# Patient Record
Sex: MALE | Race: WHITE | HISPANIC OR LATINO | Employment: FULL TIME | URBAN - NONMETROPOLITAN AREA
[De-identification: names, ages, dates, MRNs, and addresses within clinical notes are randomized per-mention and may not be internally consistent; named-entity substitution may affect disease eponyms.]

---

## 2023-11-20 ENCOUNTER — TELEPHONE (OUTPATIENT)
Dept: PULMONOLOGY | Facility: CLINIC | Age: 43
End: 2023-11-20

## 2023-11-20 NOTE — TELEPHONE ENCOUNTER
Caller: JULIO JEREZ    Relationship: SELF    Best call back number: [phone]    What form or medical record are you requesting: APPT REMINDER FOR Blessing MORRISON, 12/7/23, 2:00 pm ([employer] EMPLOYEES ARE UNABLE TO MAIL PRINT REMINDERS.)    Who is requesting this form or medical record from you: PATIENT REQUESTED    How would you like to receive the form or medical records (pick-up, mail, fax): MAIL  If mail, what is the address:   [address] [place] [address]

## 2023-12-06 NOTE — PROGRESS NOTES
"PRINCE Tang  [facility]   Pulmonary and Critical Care  [address] [place] [address]  Phone: [phone]  Fax: [phone]           Chief Complaint  Abnormal PFT    Subjective    History of Present Illness     Montse Michaels presents to [facility] PULMONARY & CRITICAL CARE MEDICINE   History of Present Illness  Mr. Michaels is a 43-year-old male patient referred by Mahsa Warner NP for abnormal PFT.  Past medical history for low testosterone on supplement.  His mother had COPD. His smoking history includes former smoker of 1 PPD x 25 years quitting in 2020.  He is on no inhalers. 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 Pulmonary function study last month showed an FEV1 of 62% predicted showing moderate restriction.  He has dyspnea that is periodic with climbing steps. He walks on a treadmill and lifts weights.  He has a cough that is worse with weather change until he acclimates. He has a lot of nasal drainage. He has tried various nasal sprays or antihistamines in the past which have helped but he does not like to take them. He denies fever, chills, night sweats. He denies swelling. He had an inconclusive home study about 3-4 years ago. He felt at that time he was not sleeping well, lost of headaches, fatigue and only later was felt to be his testosterone. He is trying to loose weight. He states he would pull the mask off in his sleep over a week and a half for the test.        Objective   Vital Signs:   /98   Pulse 81   Ht 177.8 cm (70\")   Wt 118 kg (260 lb 12.8 oz)   SpO2 97% Comment: RA  BMI 37.42 kg/m²     Physical Exam  Vitals reviewed.   Constitutional:       Appearance: Normal appearance. He is obese.   Cardiovascular:      Rate and Rhythm: Normal rate and regular rhythm.   Pulmonary:      Effort: Pulmonary effort is normal.      Breath sounds: Normal " breath sounds.   Neurological:      General: No focal deficit present.      Mental Status: He is alert and oriented to person, place, and time.   Psychiatric:         Mood and Affect: Mood normal.         Behavior: Behavior normal.          Result Review :  The following data was reviewed by: PRINCE Tang on 12/07/2023:    My interpretation of imaging: None  My interpretation of labs: None      My interpretation of the PFT : Moderate restriction with an FEV1 FVC ratio of 78 and FEV1 is 62% predicted.    No results found for this or any previous visit.        Assessment and Plan   Diagnoses and all orders for this visit:    1. Chemical exposure (Primary)  -     CT Chest Hi Resolution Diagnostic; Future    2. Dyspnea on exertion  -     Complete PFT - Pre & Post Bronchodilator; Future      He likely has untreated sleep apnea. He will think about if he would like to repeat a sleep study. He is agreeable to have a complete pulmonary function study. His prior symptoms which prompted the home study improved once he started testosterone replacement. His restriction on the FVL could be related to his weight. He is currently exercising and trying to loose weight. He does have a 27 year expsosure to working with lyme rock and dust. He is agreeable to check a HRCT. If no abnormalities on the HRCT and complete pre post PFT confirms restriction then could be related to his weight.       Follow Up   Return in about 2 months (around 2/5/2024) for PFT-complete at hospital, HRCT .  Patient was given instructions and counseling regarding his condition or for health maintenance advice. Please see specific information pulled into the AVS if appropriate.     PRINCE Tang  12/7/2023  15:11 CST

## 2023-12-07 ENCOUNTER — OFFICE VISIT (OUTPATIENT)
Dept: PULMONOLOGY | Facility: CLINIC | Age: 43
End: 2023-12-07
Payer: COMMERCIAL

## 2023-12-07 VITALS
SYSTOLIC BLOOD PRESSURE: 138 MMHG | WEIGHT: 260.8 LBS | DIASTOLIC BLOOD PRESSURE: 88 MMHG | HEIGHT: 70 IN | BODY MASS INDEX: 37.34 KG/M2 | HEART RATE: 81 BPM | OXYGEN SATURATION: 97 %

## 2023-12-07 DIAGNOSIS — Z77.098 CHEMICAL EXPOSURE: Primary | ICD-10-CM

## 2023-12-07 DIAGNOSIS — R06.09 DYSPNEA ON EXERTION: ICD-10-CM

## 2023-12-07 RX ORDER — TESTOSTERONE CYPIONATE 200 MG/ML
200 INJECTION, SOLUTION INTRAMUSCULAR
COMMUNITY
Start: 2023-10-25

## 2024-02-02 ENCOUNTER — HOSPITAL ENCOUNTER (OUTPATIENT)
Dept: PULMONOLOGY | Facility: HOSPITAL | Age: 44
Discharge: HOME | End: 2024-02-02
Payer: COMMERCIAL

## 2024-02-02 ENCOUNTER — HOSPITAL ENCOUNTER (OUTPATIENT)
Dept: CT IMAGING | Facility: HOSPITAL | Age: 44
Discharge: HOME | End: 2024-02-02
Payer: COMMERCIAL

## 2024-02-02 DIAGNOSIS — R06.09 DYSPNEA ON EXERTION: ICD-10-CM

## 2024-02-02 DIAGNOSIS — Z77.098 CHEMICAL EXPOSURE: ICD-10-CM

## 2024-02-02 PROCEDURE — 94060 EVALUATION OF WHEEZING: CPT

## 2024-02-02 PROCEDURE — 71250 CT THORAX DX C-: CPT

## 2024-02-02 PROCEDURE — 94726 PLETHYSMOGRAPHY LUNG VOLUMES: CPT

## 2024-02-02 PROCEDURE — 94729 DIFFUSING CAPACITY: CPT

## 2024-02-02 RX ORDER — ALBUTEROL SULFATE 2.5 MG/3ML
2.5 SOLUTION RESPIRATORY (INHALATION) ONCE
Status: COMPLETED | OUTPATIENT
Start: 2024-02-02 | End: 2024-02-02

## 2024-02-02 RX ORDER — ALBUTEROL SULFATE 2.5 MG/3ML
2.5 SOLUTION RESPIRATORY (INHALATION) ONCE
Status: CANCELLED | OUTPATIENT
Start: 2024-02-02 | End: 2024-02-02

## 2024-02-02 RX ADMIN — ALBUTEROL SULFATE 2.5 MG: 2.5 SOLUTION RESPIRATORY (INHALATION) at 12:38

## 2024-02-05 NOTE — PROGRESS NOTES
" PRINCE Tang  [facility]   Pulmonary and Critical Care  [address] [place] [address]  Phone: [phone]  Fax: [phone]           Chief Complaint  Chemical Exposure    Subjective    History of Present Illness     Montse Michaels presents to [facility] PULMONARY & CRITICAL CARE MEDICINE   History of Present Illness  Mr. Michaels is a 44-year-old male patient referred at last visit for abnormal PFT.  Former smoker, low testosterone, family history of COPD. Pulmonary function study last month showed an FEV1 of 62% predicted showing moderate restriction.  Repeat PFT shows decreased FEF max but otherwise normal limits on spirometry.  No significant change in the FEV1 or FVC postbronchodilator with significant change in the mid flows.  Lung volumes with mild hyperinflation and diffusion capacity was normal.  Prebronchodilator his inspiratory limb was flat and postbronchodilator was bilobed suggesting a possible pause in his inspiration.  He continues to walk on a treadmill and lift weights. His dyspnea remains periodic and worse with climbing steps. Cough is worse with weather change. He has postnasal drainage for which antihistamines and various nasal sprays have helped in the past however he does not like to take them.He denies fever, chills, night sweats. He denies swelling.  HRCT did not show any interstitial lung disease or fibrosis. He does have several 3-5 mm lung nodules that are small.  He has had a home sleep study approximately 2 years ago however he was unable to tolerate the mask secondary to high pressures.       Objective   Vital Signs:   /86   Pulse 79   Ht 177.8 cm (70\")   Wt 118 kg (259 lb 3.2 oz)   SpO2 96% Comment: RA  BMI 37.19 kg/m²     Physical Exam  Vitals reviewed.   Constitutional:       Appearance: Normal appearance. He is obese.   Cardiovascular:      Rate and Rhythm: Normal rate and regular rhythm.   Pulmonary:      Effort: " Pulmonary effort is normal.      Breath sounds: Normal breath sounds.   Neurological:      General: No focal deficit present.      Mental Status: He is alert and oriented to person, place, and time.   Psychiatric:         Mood and Affect: Mood normal.         Behavior: Behavior normal.          Result Review :  The following data was reviewed by: PRINCE Tang on 02/07/2024:    Data reviewed : Radiologic studies high-res CT 2/2/2024    My interpretation of imaging: As in HPI  My interpretation of labs: None  CT Chest Hi Resolution Diagnostic (02/02/2024 10:29)     My interpretation of the PFT : As in HPI  Complete PFT - Pre & Post Bronchodilator (02/02/2024 12:28)   Results for orders placed during the hospital encounter of 02/02/24    Complete PFT - Pre & Post Bronchodilator    [facility] - Pulmonary Function Test    [address] [place] [address]  [id number]  [phone]    Patient : Montse Michales  MRN : [id number]  CSN : [id number]  Pulmonologist : Silvestre Miller MD  Date : 2/5/2024    ______________________________________________________________________    Interpretation :  1.  Spirometry reveals a decrease in FEF Max and otherwise is within normal limits.  2.  There is improvement in the patient's FEF Max postbronchodilator so that it now is only minimally decreased and midflows have improved and are now supranormal.  Otherwise there is no significant change in spirometry postbronchodilator.  3.  Lung volumes reveal mild hyperinflation.  4.  Diffusion capacity is within normal limits.  5.  There is flattening of the inspiratory limb of the flow-volume loop on the prebronchodilator studies.  The postbronchodilator flow-volume loop actually reveals a bilobed pattern with no flattening but would suggest that the patient paused his inspiration and then resumed inspiration during the postbronchodilator flow-volume loop.  Clinical correlation is still advised regarding the  possibility of a variable extrathoracic upper airway obstruction.    Silvestre Miller MD        Assessment and Plan   Diagnoses and all orders for this visit:    1. Small airways disease (Primary)  Overview:  Significant postbronchodilator response  to midflows on PFT 2/2/2024    Assessment & Plan:  Patient is provided a prescription for albuterol rescue inhaler to use as needed.      2. Multiple lung nodules  Overview:  Multiple 3 to 5 mm nodules on high-res CT 2/2/2024      3. Chemical exposure  Overview:  27 years exposed to [place]      4. Postnasal drip    5. Abnormal PFT  Comments:  Referral to ENT, prefers Dr. Garfield Sharp  Orders:  -     Ambulatory Referral to ENT (Otolaryngology)    Other orders  -     albuterol sulfate  (90 Base) MCG/ACT inhaler; Inhale 2 puffs Every 4 (Four) Hours As Needed for Shortness of Air or Wheezing.  Dispense: 18 g; Refill: 3      Patient does note that the respiratory therapist got him messed up on the inspiratory curve on his flow-volume loop which could account for the bilobed postbronchodilator tracing.  He does however still have flattening of the inspiratory curve prebronchodilator.  He also continues to have snoring, restless leg, nonrestorative sleep and was diagnosed with sleep apnea in the past.  He is hesitant to move forward with a repeat sleep study or wearing a mask given his intolerability in the past.  He also notes that his watch has documented several episodes of nocturnal hypoxia with O2 sats in the upper 80s.  He would like to discuss inspire with ENT prior to moving forward with any other workup.  He is advised however that he would likely have to have a repeat sleep study before moving forward.  Offered overnight pulse oximetry to evaluate his O2 sat as well as ASIA and again he would like to wait at this time.  Referral made to ENT for the abnormal PFT and preliminary discussion on inspire.          Follow Up   Return in about 6 months (around  8/7/2024).  Patient was given instructions and counseling regarding his condition or for health maintenance advice. Please see specific information pulled into the AVS if appropriate.     Winifred Osborn, APRN  2/7/2024  12:39 CST

## 2024-02-07 ENCOUNTER — OFFICE VISIT (OUTPATIENT)
Dept: PULMONOLOGY | Facility: CLINIC | Age: 44
End: 2024-02-07
Payer: COMMERCIAL

## 2024-02-07 VITALS
WEIGHT: 259.2 LBS | SYSTOLIC BLOOD PRESSURE: 118 MMHG | BODY MASS INDEX: 37.11 KG/M2 | HEIGHT: 70 IN | DIASTOLIC BLOOD PRESSURE: 86 MMHG | OXYGEN SATURATION: 96 % | HEART RATE: 79 BPM

## 2024-02-07 DIAGNOSIS — J98.4 SMALL AIRWAYS DISEASE: Primary | ICD-10-CM

## 2024-02-07 DIAGNOSIS — Z77.098 CHEMICAL EXPOSURE: ICD-10-CM

## 2024-02-07 DIAGNOSIS — R94.2 ABNORMAL PFT: ICD-10-CM

## 2024-02-07 DIAGNOSIS — R91.8 MULTIPLE LUNG NODULES: ICD-10-CM

## 2024-02-07 DIAGNOSIS — R09.82 POSTNASAL DRIP: ICD-10-CM

## 2024-02-07 PROCEDURE — 99214 OFFICE O/P EST MOD 30 MIN: CPT | Performed by: NURSE PRACTITIONER

## 2024-02-07 RX ORDER — ALBUTEROL SULFATE 90 UG/1
2 AEROSOL, METERED RESPIRATORY (INHALATION) EVERY 4 HOURS PRN
Qty: 18 G | Refills: 3 | Status: SHIPPED | OUTPATIENT
Start: 2024-02-07

## 2024-02-08 ENCOUNTER — TELEPHONE (OUTPATIENT)
Dept: OTOLARYNGOLOGY | Facility: CLINIC | Age: 44
End: 2024-02-08
Payer: COMMERCIAL

## 2024-02-08 NOTE — TELEPHONE ENCOUNTER
HUB can read     Attempted to call patient, but there was not answer. I left  with appt info for 3/14 at 215pm with SCJ. Gave office location and phone number if needed.

## 2024-02-09 ENCOUNTER — TELEPHONE (OUTPATIENT)
Dept: PULMONOLOGY | Facility: CLINIC | Age: 44
End: 2024-02-09
Payer: COMMERCIAL

## 2024-02-09 NOTE — TELEPHONE ENCOUNTER
Winifred Osborn APRN Turner, Renae; Cassandra Coles, KATHARINA Trujillo,  The patient just wanted to ask him a few questions about it before he pursued any further workup for his sleep apnea. Can you contact the patient and ask him what he would like to do. I am not sure where he had that last sleep study done at. Let him know that for him to have the eval we have to get the sleep study results and it has to have been done in the last 2 years and he has to meet the AHI requirements. If his sleep study was longer than he would have to have a repeat study which I had advised him may need to be done. Update me after you talk with him and I will update either the referral and/or Lou    Thank you,  PRINCE Mendez

## 2024-02-15 ENCOUNTER — TELEPHONE (OUTPATIENT)
Dept: PULMONOLOGY | Facility: CLINIC | Age: 44
End: 2024-02-15
Payer: COMMERCIAL

## 2024-02-15 NOTE — TELEPHONE ENCOUNTER
Left message for patient to call back regarding ENT requiring him to have a new home sleep study done as it has been over two years from the previous one.  It will need to be updated for referral to discuss Inspire.  Patient can keep 04/18/24 appointment with Dr. Sharp for his Abnormal PFT until a sleep study can be done.

## 2024-02-19 NOTE — TELEPHONE ENCOUNTER
Spoke to patient.  He has decided to hold off on the home sleep study and will keep planned appointment with Dr. Sharp on 04/18 and go from there.

## 2024-04-18 ENCOUNTER — OFFICE VISIT (OUTPATIENT)
Dept: OTOLARYNGOLOGY | Facility: CLINIC | Age: 44
End: 2024-04-18
Payer: COMMERCIAL

## 2024-04-18 ENCOUNTER — TELEPHONE (OUTPATIENT)
Dept: OTOLARYNGOLOGY | Facility: CLINIC | Age: 44
End: 2024-04-18
Payer: COMMERCIAL

## 2024-04-18 VITALS
SYSTOLIC BLOOD PRESSURE: 144 MMHG | TEMPERATURE: 97.7 F | DIASTOLIC BLOOD PRESSURE: 90 MMHG | BODY MASS INDEX: 36.83 KG/M2 | WEIGHT: 257.25 LBS | HEIGHT: 70 IN | HEART RATE: 91 BPM

## 2024-04-18 DIAGNOSIS — R94.2 ABNORMAL PFT: ICD-10-CM

## 2024-04-18 DIAGNOSIS — R06.83 SNORING: ICD-10-CM

## 2024-04-18 DIAGNOSIS — R53.83 OTHER FATIGUE: ICD-10-CM

## 2024-04-18 DIAGNOSIS — G47.33 OSA (OBSTRUCTIVE SLEEP APNEA): Primary | ICD-10-CM

## 2024-04-18 DIAGNOSIS — Z78.9 INTOLERANCE OF CONTINUOUS POSITIVE AIRWAY PRESSURE (CPAP) VENTILATION: ICD-10-CM

## 2024-04-18 RX ORDER — NICOTINE POLACRILEX 4 MG/1
20 GUM, CHEWING ORAL 2 TIMES DAILY
Qty: 60 EACH | Refills: 3 | Status: SHIPPED | OUTPATIENT
Start: 2024-04-18 | End: 2024-05-18

## 2024-04-18 NOTE — PROGRESS NOTES
YOB: 1980  Location: [facility]  Location Address: [address] [place] [address],  [address], [address] [place] [address]  Location Phone: [phone]    Chief Complaint   Patient presents with    Abnormal PFT     Has had a normal one since referral    Sleep Apnea       History of Present Illness  Montse Michaels is a 44 y.o. male.  Montse Michaels is here for evaluation of ENT complaints. The patient has had problems with abnormal PFT, snoring, sleep apnea, fatigue, and daytime somnolence.     Montse Michaels was first diagnosed with sleep apnea 2 years ago   He has tried using cpap with several different masks/settings for 1 years.   Currently patient is wearing cpap 0 hours per night.     EPWORTH: 5  AHI: 14.5 on 2022  BMI 36.91    Reviewed:  [facility] - Pulmonary Function Test     [address] [place], [place] [id number]  [phone]     Patient : Montse Michaels   MRN : [id number]  CSN : [id number]  Pulmonologist : Silvestre Miller MD  Date : 2024     ______________________________________________________________________     Interpretation :  1.  Spirometry reveals a decrease in FEF Max and otherwise is within normal limits.  2.  There is improvement in the patient's FEF Max postbronchodilator so that it now is only minimally decreased and midflows have improved and are now supranormal.  Otherwise there is no significant change in spirometry postbronchodilator.  3.  Lung volumes reveal mild hyperinflation.  4.  Diffusion capacity is within normal limits.  5.  There is flattening of the inspiratory limb of the flow-volume loop on the prebronchodilator studies.  The postbronchodilator flow-volume loop actually reveals a bilobed pattern with no flattening but would suggest that the patient paused his inspiration and then resumed inspiration during the postbronchodilator flow-volume loop.  Clinical correlation is still advised regarding the possibility of a variable extrathoracic upper airway obstruction.      Silvestre Miller MD                   Past Medical History:   Diagnosis Date    Low testosterone        History reviewed. No pertinent surgical history.    No outpatient medications have been marked as taking for the 24 encounter (Office Visit) with Garfield Sharp MD.       Patient has no known allergies.    Family History   Problem Relation Age of Onset    Osteoporosis Mother     COPD Mother     McLean's disease Mother     Emphysema Mother     No Known Problems Father        Social History     Socioeconomic History    Marital status:    Tobacco Use    Smoking status: Former     Current packs/day: 0.00     Average packs/day: 1 pack/day for 25.0 years (25.0 ttl pk-yrs)     Types: Cigarettes     Start date:      Quit date:      Years since quittin.2     Passive exposure: Past    Smokeless tobacco: Never   Vaping Use    Vaping status: Former    Substances: Nicotine   Substance and Sexual Activity    Alcohol use: Yes    Drug use: Never       Review of Systems   Constitutional: Negative.  Positive for fatigue.   Respiratory:  Positive for apnea.        Vitals:    24 1317   BP: 144/90   Pulse: 91   Temp: 97.7 °F (36.5 °C)       Body mass index is 36.91 kg/m².    Objective     Physical Exam  Vitals reviewed.   Constitutional:       Appearance: Normal appearance. He is obese.   HENT:      Head: Normocephalic.      Right Ear: Tympanic membrane, ear canal and external ear normal.      Left Ear: Tympanic membrane, ear canal and external ear normal.      Nose: Nose normal.      Mouth/Throat:      Lips: Pink.      Mouth: Mucous membranes are moist.      Pharynx: Oropharynx is clear.      Tonsils: 3+ on the right. 3+ on the left.      Comments: Roach III  Musculoskeletal:      Cervical back: Full passive range of motion without pain.   Neurological:      Mental Status: He is alert.   Psychiatric:         Behavior: Behavior is cooperative.         Assessment & Plan   Diagnoses and all  orders for this visit:    1. MAKENNA (obstructive sleep apnea) (Primary)  -     Case Request; Standing  -     Basic Metabolic Panel; Future  -     CBC (No Diff); Future  -     ECG 12 Lead; Future  -     XR Chest 1 View; Future  -     Case Request  -     Polysomnography 4 or More Parameters; Future    2. Abnormal PFT    3. Other fatigue  -     Case Request; Standing  -     Basic Metabolic Panel; Future  -     CBC (No Diff); Future  -     ECG 12 Lead; Future  -     XR Chest 1 View; Future  -     Case Request  -     Polysomnography 4 or More Parameters; Future    4. Snoring  -     Case Request; Standing  -     Basic Metabolic Panel; Future  -     CBC (No Diff); Future  -     ECG 12 Lead; Future  -     XR Chest 1 View; Future  -     Case Request  -     Polysomnography 4 or More Parameters; Future    5. Intolerance of continuous positive airway pressure (CPAP) ventilation  -     Case Request; Standing  -     Basic Metabolic Panel; Future  -     CBC (No Diff); Future  -     ECG 12 Lead; Future  -     XR Chest 1 View; Future  -     Case Request    Other orders  -     Follow Anesthesia Guidelines / Protocol; Future  -     Obtain Informed Consent  -     Follow Anesthesia Guidelines / Protocol; Standing  -     Omeprazole 20 MG tablet delayed-release; Take 20 mg by mouth 2 (Two) Times a Day for 30 days.  Dispense: 60 each; Refill: 3      Videosleep endoscopy (N/A)  Orders Placed This Encounter   Procedures    XR Chest 1 View     Standing Status:   Future     Standing Expiration Date:   4/18/2025     Order Specific Question:   Reason for Exam:     Answer:   pre operative     Order Specific Question:   Release to patient     Answer:   Routine Release [[id number]]    Basic Metabolic Panel     Standing Status:   Future     Standing Expiration Date:   4/18/2025     Order Specific Question:   Release to patient     Answer:   Routine Release [[id number]]    CBC (No Diff)     Standing Status:   Future     Standing Expiration Date:    4/18/2025     Order Specific Question:   Release to patient     Answer:   Routine Release [[id number]]    Obtain Informed Consent     Order Specific Question:   Informed Consent Given For     Answer:   Videosleep endoscopy    ECG 12 Lead     Standing Status:   Future     Standing Expiration Date:   4/18/2025     Order Specific Question:   Reason for Exam:     Answer:   pre operative     Order Specific Question:   Release to patient     Answer:   Routine Release [[id number]]    Polysomnography 4 or More Parameters     Standing Status:   Future     Standing Expiration Date:   4/18/2025     Order Specific Question:   Split Night     Answer:   No     Order Specific Question:   May take own meds     Answer:   Yes     Order Specific Question:   Details     Answer:   O2 Implementation per Protocol     Order Specific Question:   Release to patient     Answer:   Routine Release [[id number]]     Consider UVPP  Will obtain video sleep endoscopy  Repeat sleep study  Return for problems    Return for Next scheduled follow up.       Patient Instructions   Consider UVPP  Will obtain video sleep endoscopy  Repeat sleep study  Return for problems    CONTACT INFORMATION:  The main office phone number is [phone]. For emergencies after hours and on weekends, this number will convert over to our answering service and the on call provider will answer. Please try to keep non emergent phone calls/ questions to office hours 9am-5pm Monday through Friday.      IDRI ([facility])  As an alternative, you can sign up and use the Epic MyChart system for more direct and quicker access for non emergent questions/ problems.  [facility] allows you to send messages to your doctor, view your test results, renew your prescriptions, schedule appointments, and more. To sign up, go to [employer] and click on the Sign Up Now link in the New User? box. Enter your IDRI ([employer]) Activation Code exactly as it appears below along with the last  four digits of your Social Security Number and your Date of Birth () to complete the sign-up process. If you do not sign up before the expiration date, you must request a new code.     [employer] Activation Code: Activation code not generated  Current [place] Status: Active     If you have questions, you can email [email or web address] or call [phone] to talk to our [employer] staff. Remember, [employer] is NOT to be used for urgent needs. For medical emergencies, dial 911.     IF YOU SMOKE OR USE TOBACCO PLEASE READ THE FOLLOWING:  Why is smoking bad for me?  Smoking increases the risk of heart disease, lung disease, vascular disease, stroke, and cancer. If you smoke, STOP!        IF YOU SMOKE OR USE TOBACCO PLEASE READ THE FOLLOWING:  Why is smoking bad for me?  Smoking increases the risk of heart disease, lung disease, vascular disease, stroke, and cancer. If you smoke, STOP!     For more information:  Quit Now [place]  -QUIT-NOW  [email or web address]

## 2024-04-18 NOTE — PROGRESS NOTES
OPERATIVE NOTE:  Montse Michaels    DATE OF PROCEDURE: 4/18/2024    PROCEDURE:   Flexible Fiberoptic Laryngoscopy    ANESTHESIA:  None    REASON FOR PROCEDURE:  Procedure was recommended for suspicious clinical behavior, globus sensation, and history of obstructive sleep apnea intolerant to CPAP with abnormal PFT  Risks, benefits and alternatives were discussed.      DETAILS of OPERATION:  The patient was seated in the exam chair.  A flexible fiberoptic laryngoscopy was performed through the oral cavity.  The scope was introduced into the oral cavity and directed to the level of the glottis, examining the structures of the oropharynx, base of tongue, vallecula, supraglottic larynx, glottic larynx, and hypopharynx.      FINDINGS:  Mucosal surfaces:   The mucosal surfaces demonstrated normal mucosa surfaces with moderate inflammation    Base of tongue:  The base of tongue was found to have no mass or lesion and only mild lymphoid hyperplasia at the base of the tongue.    Epiglottis:  The epiglottis was found to have no mass or lesion.    Aryepiglottic fold:  The AE folds were found to have no mass or lesion.    False Vocal Fold:  The false cords were found to have no mass or lesion.    True Vocal Cord:  The true vocal cords were found to have no mass or lesion. Both true vocal cords adduct and abduct normally    Arytenoid:   The arytenoids were found to have moderate inter-arytenoid edema without erythema.    Hypopharynx:  The hypopharynx was found to have no mass or lesion.    The patient tolerated procedure well.

## 2024-04-18 NOTE — TELEPHONE ENCOUNTER
In lab sleep study at [facility] on 7/22/24 @ 8pm    [address]  [facility] [address]    [phone]

## 2024-04-18 NOTE — PATIENT INSTRUCTIONS
Consider UVPP  Will obtain video sleep endoscopy  Repeat sleep study  Return for problems    CONTACT INFORMATION:  The main office phone number is [phone]. For emergencies after hours and on weekends, this number will convert over to our answering service and the on call provider will answer. Please try to keep non emergent phone calls/ questions to office hours 9am-5pm Monday through Friday.      Skytree  As an alternative, you can sign up and use the Epic MyChart system for more direct and quicker access for non emergent questions/ problems.  [employer]e allows you to send messages to your doctor, view your test results, renew your prescriptions, schedule appointments, and more. To sign up, go to truedash and click on the Sign Up Now link in the New User? box. Enter your Skytree Activation Code exactly as it appears below along with the last four digits of your Social Security Number and your Date of Birth () to complete the sign-up process. If you do not sign up before the expiration date, you must request a new code.     Skytree Activation Code: Activation code not generated  Current Skytree Status: Active     If you have questions, you can email [email or web address] or call [phone] to talk to our Skytree staff. Remember, Skytree is NOT to be used for urgent needs. For medical emergencies, dial 911.     IF YOU SMOKE OR USE TOBACCO PLEASE READ THE FOLLOWING:  Why is smoking bad for me?  Smoking increases the risk of heart disease, lung disease, vascular disease, stroke, and cancer. If you smoke, STOP!        IF YOU SMOKE OR USE TOBACCO PLEASE READ THE FOLLOWING:  Why is smoking bad for me?  Smoking increases the risk of heart disease, lung disease, vascular disease, stroke, and cancer. If you smoke, STOP!     For more information:  Quit Now [place]  -QUIT-NOW  [email or web address]

## 2024-04-19 PROBLEM — R06.83 SNORING: Status: ACTIVE | Noted: 2024-04-18

## 2024-04-19 PROBLEM — R53.83 OTHER FATIGUE: Status: ACTIVE | Noted: 2024-04-18

## 2024-04-19 PROBLEM — G47.33 OSA (OBSTRUCTIVE SLEEP APNEA): Status: ACTIVE | Noted: 2024-04-18

## 2024-04-19 PROBLEM — Z78.9 INTOLERANCE OF CONTINUOUS POSITIVE AIRWAY PRESSURE (CPAP) VENTILATION: Status: ACTIVE | Noted: 2024-04-18

## 2024-05-23 ENCOUNTER — HOSPITAL ENCOUNTER (OUTPATIENT)
Dept: GENERAL RADIOLOGY | Facility: HOSPITAL | Age: 44
Discharge: HOME | End: 2024-05-23
Payer: COMMERCIAL

## 2024-05-23 ENCOUNTER — PRE-ADMISSION TESTING (OUTPATIENT)
Dept: PREADMISSION TESTING | Facility: HOSPITAL | Age: 44
End: 2024-05-23
Payer: COMMERCIAL

## 2024-05-23 VITALS
HEART RATE: 83 BPM | OXYGEN SATURATION: 96 % | SYSTOLIC BLOOD PRESSURE: 151 MMHG | RESPIRATION RATE: 18 BRPM | HEIGHT: 69 IN | DIASTOLIC BLOOD PRESSURE: 82 MMHG | WEIGHT: 265.43 LBS | BODY MASS INDEX: 39.31 KG/M2

## 2024-05-23 DIAGNOSIS — Z78.9 INTOLERANCE OF CONTINUOUS POSITIVE AIRWAY PRESSURE (CPAP) VENTILATION: ICD-10-CM

## 2024-05-23 DIAGNOSIS — R53.83 OTHER FATIGUE: ICD-10-CM

## 2024-05-23 DIAGNOSIS — R06.83 SNORING: ICD-10-CM

## 2024-05-23 DIAGNOSIS — G47.33 OSA (OBSTRUCTIVE SLEEP APNEA): ICD-10-CM

## 2024-05-23 LAB
ANION GAP SERPL CALCULATED.3IONS-SCNC: 10 MMOL/L (ref 5–15)
BUN SERPL-MCNC: 9 MG/DL (ref 6–20)
BUN/CREAT SERPL: 7.8 (ref 7–25)
CALCIUM SPEC-SCNC: 9 MG/DL (ref 8.6–10.5)
CHLORIDE SERPL-SCNC: 101 MMOL/L (ref 98–107)
CO2 SERPL-SCNC: 27 MMOL/L (ref 22–29)
CREAT SERPL-MCNC: 1.16 MG/DL (ref 0.76–1.27)
DEPRECATED RDW RBC AUTO: 38.3 FL (ref 37–54)
EGFRCR SERPLBLD CKD-EPI 2021: 78.7 ML/MIN/1.73
ERYTHROCYTE [DISTWIDTH] IN BLOOD BY AUTOMATED COUNT: 12.9 % (ref 12.3–15.4)
GLUCOSE SERPL-MCNC: 101 MG/DL (ref 65–99)
HCT VFR BLD AUTO: 50.8 % (ref 37.5–51)
HGB BLD-MCNC: 16.4 G/DL (ref 13–17.7)
MCH RBC QN AUTO: 26.4 PG (ref 26.6–33)
MCHC RBC AUTO-ENTMCNC: 32.3 G/DL (ref 31.5–35.7)
MCV RBC AUTO: 81.7 FL (ref 79–97)
PLATELET # BLD AUTO: 172 10*3/MM3 (ref 140–450)
PMV BLD AUTO: 9.6 FL (ref 6–12)
POTASSIUM SERPL-SCNC: 3.9 MMOL/L (ref 3.5–5.2)
QT INTERVAL: 362 MS
QTC INTERVAL: 390 MS
RBC # BLD AUTO: 6.22 10*6/MM3 (ref 4.14–5.8)
SODIUM SERPL-SCNC: 138 MMOL/L (ref 136–145)
WBC NRBC COR # BLD AUTO: 9.43 10*3/MM3 (ref 3.4–10.8)

## 2024-05-23 PROCEDURE — 80048 BASIC METABOLIC PNL TOTAL CA: CPT

## 2024-05-23 PROCEDURE — 85027 COMPLETE CBC AUTOMATED: CPT

## 2024-05-23 PROCEDURE — 93005 ELECTROCARDIOGRAM TRACING: CPT

## 2024-05-23 PROCEDURE — 71045 X-RAY EXAM CHEST 1 VIEW: CPT

## 2024-05-23 PROCEDURE — 36415 COLL VENOUS BLD VENIPUNCTURE: CPT

## 2024-05-23 RX ORDER — OMEPRAZOLE 20 MG/1
20 CAPSULE, DELAYED RELEASE ORAL DAILY
COMMUNITY

## 2024-05-23 NOTE — DISCHARGE INSTRUCTIONS

## 2024-05-24 ENCOUNTER — HOSPITAL ENCOUNTER (OUTPATIENT)
Facility: HOSPITAL | Age: 44
Setting detail: HOSPITAL OUTPATIENT SURGERY
Discharge: HOME | End: 2024-05-24
Attending: OTOLARYNGOLOGY | Admitting: OTOLARYNGOLOGY
Payer: COMMERCIAL

## 2024-05-24 ENCOUNTER — ANESTHESIA (OUTPATIENT)
Dept: PERIOP | Facility: HOSPITAL | Age: 44
End: 2024-05-24
Payer: COMMERCIAL

## 2024-05-24 ENCOUNTER — ANESTHESIA EVENT (OUTPATIENT)
Dept: PERIOP | Facility: HOSPITAL | Age: 44
End: 2024-05-24
Payer: COMMERCIAL

## 2024-05-24 VITALS
OXYGEN SATURATION: 94 % | TEMPERATURE: 97.8 F | DIASTOLIC BLOOD PRESSURE: 101 MMHG | HEART RATE: 75 BPM | SYSTOLIC BLOOD PRESSURE: 142 MMHG | RESPIRATION RATE: 16 BRPM

## 2024-05-24 PROCEDURE — 25010000002 PROPOFOL 10 MG/ML EMULSION: Performed by: NURSE ANESTHETIST, CERTIFIED REGISTERED

## 2024-05-24 PROCEDURE — 42975 DISE EVAL SLP DO BRTH FLX DX: CPT | Performed by: OTOLARYNGOLOGY

## 2024-05-24 PROCEDURE — 25810000003 LACTATED RINGERS PER 1000 ML: Performed by: OTOLARYNGOLOGY

## 2024-05-24 RX ORDER — IBUPROFEN 200 MG
600 TABLET ORAL EVERY 6 HOURS PRN
COMMUNITY

## 2024-05-24 RX ORDER — SODIUM CHLORIDE 9 MG/ML
40 INJECTION, SOLUTION INTRAVENOUS AS NEEDED
Status: DISCONTINUED | OUTPATIENT
Start: 2024-05-24 | End: 2024-05-24 | Stop reason: HOSPADM

## 2024-05-24 RX ORDER — PROPOFOL 10 MG/ML
VIAL (ML) INTRAVENOUS AS NEEDED
Status: DISCONTINUED | OUTPATIENT
Start: 2024-05-24 | End: 2024-05-24 | Stop reason: SURG

## 2024-05-24 RX ORDER — ONDANSETRON 4 MG/1
4 TABLET, ORALLY DISINTEGRATING ORAL ONCE AS NEEDED
Status: DISCONTINUED | OUTPATIENT
Start: 2024-05-24 | End: 2024-05-24 | Stop reason: HOSPADM

## 2024-05-24 RX ORDER — OXYMETAZOLINE HYDROCHLORIDE 0.05 G/100ML
2 SPRAY NASAL
Status: COMPLETED | OUTPATIENT
Start: 2024-05-24 | End: 2024-05-24

## 2024-05-24 RX ORDER — SODIUM CHLORIDE 0.9 % (FLUSH) 0.9 %
10 SYRINGE (ML) INJECTION AS NEEDED
Status: DISCONTINUED | OUTPATIENT
Start: 2024-05-24 | End: 2024-05-24 | Stop reason: HOSPADM

## 2024-05-24 RX ORDER — LIDOCAINE HYDROCHLORIDE 10 MG/ML
0.5 INJECTION, SOLUTION EPIDURAL; INFILTRATION; INTRACAUDAL; PERINEURAL ONCE AS NEEDED
Status: DISCONTINUED | OUTPATIENT
Start: 2024-05-24 | End: 2024-05-24 | Stop reason: HOSPADM

## 2024-05-24 RX ORDER — LIDOCAINE HYDROCHLORIDE 20 MG/ML
INJECTION, SOLUTION EPIDURAL; INFILTRATION; INTRACAUDAL; PERINEURAL AS NEEDED
Status: DISCONTINUED | OUTPATIENT
Start: 2024-05-24 | End: 2024-05-24 | Stop reason: SURG

## 2024-05-24 RX ORDER — SODIUM CHLORIDE 0.9 % (FLUSH) 0.9 %
10 SYRINGE (ML) INJECTION EVERY 12 HOURS SCHEDULED
Status: DISCONTINUED | OUTPATIENT
Start: 2024-05-24 | End: 2024-05-24 | Stop reason: HOSPADM

## 2024-05-24 RX ORDER — SODIUM CHLORIDE, SODIUM LACTATE, POTASSIUM CHLORIDE, CALCIUM CHLORIDE 600; 310; 30; 20 MG/100ML; MG/100ML; MG/100ML; MG/100ML
1000 INJECTION, SOLUTION INTRAVENOUS CONTINUOUS
Status: DISCONTINUED | OUTPATIENT
Start: 2024-05-24 | End: 2024-05-24 | Stop reason: HOSPADM

## 2024-05-24 RX ORDER — SODIUM CHLORIDE 0.9 % (FLUSH) 0.9 %
3 SYRINGE (ML) INJECTION AS NEEDED
Status: DISCONTINUED | OUTPATIENT
Start: 2024-05-24 | End: 2024-05-24 | Stop reason: HOSPADM

## 2024-05-24 RX ORDER — SODIUM CHLORIDE 9 MG/ML
100 INJECTION, SOLUTION INTRAVENOUS CONTINUOUS
Status: DISCONTINUED | OUTPATIENT
Start: 2024-05-24 | End: 2024-05-24 | Stop reason: HOSPADM

## 2024-05-24 RX ADMIN — PROPOFOL 50 MG: 10 INJECTION, EMULSION INTRAVENOUS at 12:34

## 2024-05-24 RX ADMIN — Medication 2 SPRAY: at 10:42

## 2024-05-24 RX ADMIN — LIDOCAINE HYDROCHLORIDE 100 MG: 20 INJECTION, SOLUTION EPIDURAL; INFILTRATION; INTRACAUDAL; PERINEURAL at 12:29

## 2024-05-24 RX ADMIN — PROPOFOL 50 MG: 10 INJECTION, EMULSION INTRAVENOUS at 12:32

## 2024-05-24 RX ADMIN — SODIUM CHLORIDE, POTASSIUM CHLORIDE, SODIUM LACTATE AND CALCIUM CHLORIDE 1000 ML: 600; 310; 30; 20 INJECTION, SOLUTION INTRAVENOUS at 08:50

## 2024-05-24 RX ADMIN — PROPOFOL 50 MG: 10 INJECTION, EMULSION INTRAVENOUS at 12:31

## 2024-05-24 RX ADMIN — PROPOFOL 50 MG: 10 INJECTION, EMULSION INTRAVENOUS at 12:30

## 2024-05-24 NOTE — H&P
YOB: 1980  Location: [facility]  Location Address: [address] [place] [address], [facility], [address] [place] [address]  Location Phone: [phone]          Chief Complaint   Patient presents with    Abnormal PFT       Has had a normal one since referral    Sleep Apnea         History of Present Illness  Montse Michaels is a 44 y.o. male.  Montse Michaels is here for evaluation of ENT complaints. The patient has had problems with abnormal PFT, snoring, sleep apnea, fatigue, and daytime somnolence.      Montse Michaels was first diagnosed with sleep apnea 2 years ago   He has tried using cpap with several different masks/settings for 1 years.   Currently patient is wearing cpap 0 hours per night.      EPWORTH: 5  AHI: 14.5 on 2022  BMI 36.91     Reviewed:  Psychiatric - Pulmonary Function Test     [address] [place], [place] [id number]  [phone]     Patient : Montse Michaels   MRN : [id number]  CSN : [id number]  Pulmonologist : Silvestre Miller MD  Date : 2024     ______________________________________________________________________     Interpretation :  1.  Spirometry reveals a decrease in FEF Max and otherwise is within normal limits.  2.  There is improvement in the patient's FEF Max postbronchodilator so that it now is only minimally decreased and midflows have improved and are now supranormal.  Otherwise there is no significant change in spirometry postbronchodilator.  3.  Lung volumes reveal mild hyperinflation.  4.  Diffusion capacity is within normal limits.  5.  There is flattening of the inspiratory limb of the flow-volume loop on the prebronchodilator studies.  The postbronchodilator flow-volume loop actually reveals a bilobed pattern with no flattening but would suggest that the patient paused his inspiration and then resumed inspiration during the postbronchodilator flow-volume loop.  Clinical correlation is still advised regarding the possibility of a variable extrathoracic upper airway  obstruction.     Silvestre Miller MD                        Medical History        Past Medical History:   Diagnosis Date    Low testosterone              Surgical History   History reviewed. No pertinent surgical history.        Medications Taking   No outpatient medications have been marked as taking for the 24 encounter (Office Visit) with Garfield Sharp MD.            Patient has no known allergies.           Family History   Problem Relation Age of Onset    Osteoporosis Mother      COPD Mother      Terrence's disease Mother      Emphysema Mother      No Known Problems Father           Social History   Social History            Socioeconomic History    Marital status:    Tobacco Use    Smoking status: Former       Current packs/day: 0.00       Average packs/day: 1 pack/day for 25.0 years (25.0 ttl pk-yrs)       Types: Cigarettes       Start date:        Quit date:        Years since quittin.2       Passive exposure: Past    Smokeless tobacco: Never   Vaping Use    Vaping status: Former    Substances: Nicotine   Substance and Sexual Activity    Alcohol use: Yes    Drug use: Never            Review of Systems   Constitutional: Negative.  Positive for fatigue.   Respiratory:  Positive for apnea.              Vitals:     24 1317   BP: 144/90   Pulse: 91   Temp: 97.7 °F (36.5 °C)         Body mass index is 36.91 kg/m².        Objective  Physical Exam  Vitals reviewed.   Constitutional:       Appearance: Normal appearance. He is obese.   HENT:      Head: Normocephalic.      Right Ear: Tympanic membrane, ear canal and external ear normal.      Left Ear: Tympanic membrane, ear canal and external ear normal.      Nose: Nose normal.      Mouth/Throat:      Lips: Pink.      Mouth: Mucous membranes are moist.      Pharynx: Oropharynx is clear.      Tonsils: 3+ on the right. 3+ on the left.      Comments: Roach III  Musculoskeletal:      Cervical back: Full passive range of motion  without pain.   Neurological:      Mental Status: He is alert.   Psychiatric:         Behavior: Behavior is cooperative.                  Assessment & Plan  Diagnoses and all orders for this visit:     1. MAKENNA (obstructive sleep apnea) (Primary)  -     Case Request; Standing  -     Basic Metabolic Panel; Future  -     CBC (No Diff); Future  -     ECG 12 Lead; Future  -     XR Chest 1 View; Future  -     Case Request  -     Polysomnography 4 or More Parameters; Future     2. Abnormal PFT     3. Other fatigue  -     Case Request; Standing  -     Basic Metabolic Panel; Future  -     CBC (No Diff); Future  -     ECG 12 Lead; Future  -     XR Chest 1 View; Future  -     Case Request  -     Polysomnography 4 or More Parameters; Future     4. Snoring  -     Case Request; Standing  -     Basic Metabolic Panel; Future  -     CBC (No Diff); Future  -     ECG 12 Lead; Future  -     XR Chest 1 View; Future  -     Case Request  -     Polysomnography 4 or More Parameters; Future     5. Intolerance of continuous positive airway pressure (CPAP) ventilation  -     Case Request; Standing  -     Basic Metabolic Panel; Future  -     CBC (No Diff); Future  -     ECG 12 Lead; Future  -     XR Chest 1 View; Future  -     Case Request     Other orders  -     Follow Anesthesia Guidelines / Protocol; Future  -     Obtain Informed Consent  -     Follow Anesthesia Guidelines / Protocol; Standing  -     Omeprazole 20 MG tablet delayed-release; Take 20 mg by mouth 2 (Two) Times a Day for 30 days.  Dispense: 60 each; Refill: 3        Videosleep endoscopy (N/A)        Orders Placed This Encounter   Procedures    XR Chest 1 View       Standing Status:   Future       Standing Expiration Date:   4/18/2025       Order Specific Question:   Reason for Exam:       Answer:   pre operative       Order Specific Question:   Release to patient       Answer:   Routine Release [[id number]]    Basic Metabolic Panel       Standing Status:   Future       Standing  Expiration Date:   4/18/2025       Order Specific Question:   Release to patient       Answer:   Routine Release [[id number]]    CBC (No Diff)       Standing Status:   Future       Standing Expiration Date:   4/18/2025       Order Specific Question:   Release to patient       Answer:   Routine Release [[id number]]    Obtain Informed Consent       Order Specific Question:   Informed Consent Given For       Answer:   Videosleep endoscopy    ECG 12 Lead       Standing Status:   Future       Standing Expiration Date:   4/18/2025       Order Specific Question:   Reason for Exam:       Answer:   pre operative       Order Specific Question:   Release to patient       Answer:   Routine Release [1400000002]    Polysomnography 4 or More Parameters       Standing Status:   Future       Standing Expiration Date:   4/18/2025       Order Specific Question:   Split Night       Answer:   No       Order Specific Question:   May take own meds       Answer:   Yes       Order Specific Question:   Details       Answer:   O2 Implementation per Protocol       Order Specific Question:   Release to patient       Answer:   Routine Release [1400000002]      Consider UVPP  Will obtain video sleep endoscopy  Repeat sleep study  Return for problems     Return for Next scheduled follow up.           Patient Instructions   Consider UVPP  Will obtain video sleep endoscopy  Repeat sleep study  Return for problems     CONTACT INFORMATION:  The main office phone number is [phone]. For emergencies after hours and on weekends, this number will convert over to our answering service and the on call provider will answer. Please try to keep non emergent phone calls/ questions to office hours 9am-5pm Monday through Friday.      [employer]  As an alternative, you can sign up and use the Epic MyChart system for more direct and quicker access for non emergent questions/ problems.  Graspr allows you to send messages to your doctor, view your  test results, renew your prescriptions, schedule appointments, and more. To sign up, go to [employer] and click on the Sign Up Now link in the New User? box. Enter your Ec[employer]iate Activation Code exactly as it appears below along with the last four digits of your Social Security Number and your Date of Birth () to complete the sign-up process. If you do not sign up before the expiration date, you must request a new code.

## 2024-05-24 NOTE — ANESTHESIA PREPROCEDURE EVALUATION
Anesthesia Evaluation     Patient summary reviewed   no history of anesthetic complications:   NPO Solid Status: > 6 hours             Airway   Mallampati: III  Dental      Pulmonary    (+) a smoker Former, COPD,sleep apnea  Cardiovascular   Exercise tolerance: excellent (>7 METS)    (-) CABG      Neuro/Psych  GI/Hepatic/Renal/Endo    (+) obesity, GERD  (-) diabetes    Musculoskeletal     Abdominal   (+) obese   Substance History      OB/GYN          Other                          Anesthesia Plan    ASA 2     MAC     intravenous induction     Anesthetic plan, risks, benefits, and alternatives have been provided, discussed and informed consent has been obtained with: patient.        CODE STATUS:

## 2024-05-24 NOTE — ANESTHESIA POSTPROCEDURE EVALUATION
Patient: Montse Michaels    Procedure Summary       Date: 05/24/24 Room / Location:  [facility] OR 04 /  [facility]    Anesthesia Start: 1226 Anesthesia Stop: 1247    Procedure: Videosleep endoscopy Diagnosis:       MAKENNA (obstructive sleep apnea)      Other fatigue      Snoring      Intolerance of continuous positive airway pressure (CPAP) ventilation      (MAKENNA (obstructive sleep apnea) [G47.33])      (Other fatigue [R53.83])      (Snoring [R06.83])      (Intolerance of continuous positive airway pressure (CPAP) ventilation [Z78.9])    Surgeons: Garfield Sharp MD Provider: Franco Rodriguez CRNA    Anesthesia Type: MAC ASA Status: 2            Anesthesia Type: MAC    Vitals  Vitals Value Taken Time   /74 05/24/24 1244   Temp     Pulse 80 05/24/24 1247   Resp     SpO2 93 % 05/24/24 1247   Vitals shown include unfiled device data.        Post Anesthesia Care and Evaluation    Patient location during evaluation: PHASE II  Patient participation: complete - patient participated  Level of consciousness: awake  Pain score: 0  Pain management: adequate  Anesthetic complications: No anesthetic complications  PONV Status: none  Cardiovascular status: acceptable  Respiratory status: acceptable  Hydration status: acceptable

## 2024-05-24 NOTE — OP NOTE
.OPERATIVE NOTE  5/24/2024    NAME: Montse Michaels    YOB: 1980  MRN: [id number]    PRE-OPERATIVE DIAGNOSIS:    MAKENNA (obstructive sleep apnea) [G47.33]  Other fatigue [R53.83]  Snoring [R06.83]  Intolerance of continuous positive airway pressure (CPAP) ventilation [Z78.9]    POST-OPERATIVE DIAGNOSIS:   Post-Op Diagnosis Codes:     * MAKENNA (obstructive sleep apnea) [G47.33]     * Other fatigue [R53.83]     * Snoring [R06.83]     * Intolerance of continuous positive airway pressure (CPAP) ventilation [Z78.9]    PROCEDURE PERFORMED:   Drug-induced video sleep endoscopy    SURGEON:   Garfield Sharp MD    ASSISTANT(S):   None    ANESTHESIA:   IV sedation    INDICATIONS: The patient is a 44 y.o. male with MAKENNA (obstructive sleep apnea) [G47.33]  Other fatigue [R53.83]  Snoring [R06.83]  Intolerance of continuous positive airway pressure (CPAP) ventilation [Z78.9]    PROCEDURE:  The patient was brought to the operating room, given IV sedation, and prepped and draped in the usual manner.     The flexible endoscope was inserted to examine both sides of the nose as well as the pharynx and larynx.     The VOTE score at baseline was nearly complete circular or concentric collapse with significant lateral collapse.  With simulated jaw advancement and tongue advancement, the hypopharyngeal obstruction and secondarily the palatal collapse also improved.    Hypopharyngeal and endolaryngeal examination demonstrated moderate lymphoid hyperplasia at the base of the tongue with moderate interarytenoid edema consistent with laryngopharyngeal reflux.     In summary, there was no In summary, there was significant evidence of complete concentric palatal obstruction and the patient therefore is not a candidate for inspire implantation.      The patient was transported upon completion of the procedure to the postanesthesia care unit in stable condition.    SPECIMENS:  None    COMPLICATIONS: NONE    ESTIMATED BLOOD  LOSS:  None    Garfield Sharp MD  5/24/2024

## 2024-05-30 LAB
QT INTERVAL: 362 MS
QTC INTERVAL: 390 MS

## 2024-06-06 ENCOUNTER — OFFICE VISIT (OUTPATIENT)
Dept: OTOLARYNGOLOGY | Facility: CLINIC | Age: 44
End: 2024-06-06
Payer: COMMERCIAL

## 2024-06-06 VITALS
HEIGHT: 69 IN | TEMPERATURE: 98.4 F | HEART RATE: 90 BPM | BODY MASS INDEX: 38.21 KG/M2 | DIASTOLIC BLOOD PRESSURE: 84 MMHG | WEIGHT: 258 LBS | SYSTOLIC BLOOD PRESSURE: 129 MMHG

## 2024-06-06 DIAGNOSIS — R06.83 SNORING: ICD-10-CM

## 2024-06-06 DIAGNOSIS — Z78.9 INTOLERANCE OF CONTINUOUS POSITIVE AIRWAY PRESSURE (CPAP) VENTILATION: ICD-10-CM

## 2024-06-06 DIAGNOSIS — R53.83 OTHER FATIGUE: ICD-10-CM

## 2024-06-06 DIAGNOSIS — G47.33 OSA (OBSTRUCTIVE SLEEP APNEA): Primary | ICD-10-CM

## 2024-06-06 NOTE — PATIENT INSTRUCTIONS
CONTACT INFORMATION:  The main office phone number is [phone]. For emergencies after hours and on weekends, this number will convert over to our answering service and the on call provider will answer. Please try to keep non emergent phone calls/ questions to office hours 9am-5pm Monday through Friday.      [email or web address]  As an alternative, you can sign up and use the Epic MyChart system for more direct and quicker access for non emergent questions/ problems.  [employer] allows you to send messages to your doctor, view your test results, renew your prescriptions, schedule appointments, and more. To sign up, go to Vettery and click on the Sign Up Now link in the New User? box. Enter your Origo.by Activation Code exactly as it appears below along with the last four digits of your Social Security Number and your Date of Birth () to complete the sign-up process. If you do not sign up before the expiration date, you must request a new code.     [email or web address] Activation Code: Activation code not generated  Current [email or web address] Status: Active     If you have questions, you can email [email or web address] or call [phone] to talk to our [email or web address] staff. Remember, [email or web address] is NOT to be used for urgent needs. For medical emergencies, dial 911.     IF YOU SMOKE OR USE TOBACCO PLEASE READ THE FOLLOWING:  Why is smoking bad for me?  Smoking increases the risk of heart disease, lung disease, vascular disease, stroke, and cancer. If you smoke, STOP!        IF YOU SMOKE OR USE TOBACCO PLEASE READ THE FOLLOWING:  Why is smoking bad for me?  Smoking increases the risk of heart disease, lung disease, vascular disease, stroke, and cancer. If you smoke, STOP!     For more information:  Quit Now [place]  -QUIT-NOW  [email or web address]/en-[email or web address]/

## 2024-06-06 NOTE — PROGRESS NOTES
YOB: 1980  Location: [facility]  Location Address: [address] [place] [address], [facility], [address] [place] [address]  Location Phone: [phone]    Chief Complaint   Patient presents with    Sleep Apnea       History of Present Illness  Montse Michaels is a 44 y.o. male.  Montse Michaels is here for follow up of ENT complaints. The patient has had problems with abnormal pulmonary function test, snoring and sleep apnea as well as daytime somnolence  He was first diagnosed with sleep apnea 2 years ago and has tried cpap with several different masks and settings for the past year without success.  He has had a video sleep endoscopy that revealed that he was not a candidate for inspire. He has had a repeat sleep study.        EPWORTH: 7  AHI: 14.5 2022 15.7    BMI: 37.78     Past Medical History:   Diagnosis Date    GERD (gastroesophageal reflux disease)     Low testosterone     Sleep apnea        Past Surgical History:   Procedure Laterality Date    COLONOSCOPY      SLEEP ENDOSCOPY N/A 2024    Procedure: Videosleep endoscopy;  Surgeon: Garfield Sharp MD;  Location: [facility];  Service: ENT;  Laterality: N/A;       Outpatient Medications Marked as Taking for the 24 encounter (Office Visit) with Garfield Sharp MD   Medication Sig Dispense Refill    albuterol sulfate  (90 Base) MCG/ACT inhaler Inhale 2 puffs Every 4 (Four) Hours As Needed for Shortness of Air or Wheezing. 18 g 3    ibuprofen (ADVIL,MOTRIN) 200 MG tablet Take 3 tablets by mouth Every 6 (Six) Hours As Needed for Mild Pain.      omeprazole (priLOSEC) 20 MG capsule Take 1 capsule by mouth Daily.      Testosterone Cypionate (DEPOTESTOTERONE CYPIONATE) 200 MG/ML injection Inject 1 mL into the appropriate muscle as directed by prescriber Every 14 (Fourteen) Days.         Patient has no known allergies.    Family History   Problem Relation Age of Onset    Osteoporosis Mother     COPD Mother     Terrence's disease Mother      Emphysema Mother     No Known Problems Father        Social History     Socioeconomic History    Marital status:    Tobacco Use    Smoking status: Former     Current packs/day: 0.00     Average packs/day: 1 pack/day for 25.0 years (25.0 ttl pk-yrs)     Types: Cigarettes     Start date:      Quit date:      Years since quittin.4     Passive exposure: Past    Smokeless tobacco: Never   Vaping Use    Vaping status: Former    Substances: Nicotine   Substance and Sexual Activity    Alcohol use: Yes     Comment: occ    Drug use: Never       Review of Systems   Constitutional:  Positive for fatigue.   HENT:          Admits snoring      Psychiatric/Behavioral:  Positive for sleep disturbance.        Vitals:    24 1307   BP: 129/84   Pulse: 90   Temp: 98.4 °F (36.9 °C)       Body mass index is 37.78 kg/m².    Objective     Physical Exam  Vitals reviewed.   Constitutional:       Appearance: Normal appearance. He is obese.   HENT:      Head: Normocephalic.      Right Ear: Tympanic membrane, ear canal and external ear normal.      Left Ear: Tympanic membrane, ear canal and external ear normal.      Nose: Nose normal.      Mouth/Throat:      Lips: Pink.      Mouth: Mucous membranes are moist.      Pharynx: Oropharynx is clear.      Tonsils: 3+ on the right. 3+ on the left.      Comments: Roach III  Musculoskeletal:      Cervical back: Full passive range of motion without pain.   Neurological:      Mental Status: He is alert.   Psychiatric:         Behavior: Behavior is cooperative.         Assessment & Plan   Diagnoses and all orders for this visit:    1. MAKENNA (obstructive sleep apnea) (Primary)    2. Other fatigue    3. Snoring    4. Intolerance of continuous positive airway pressure (CPAP) ventilation      * Surgery not found *  No orders of the defined types were placed in this encounter.    Patient would like to work on weight loss techniques. He would like to defer UVPP at this time.    Dr. Sharp  examined and discussed care with patient and agrees with treatment plan.       Return in about 8 months (around 2025) for Recheck.       Patient Instructions   CONTACT INFORMATION:  The main office phone number is [phone]. For emergencies after hours and on weekends, this number will convert over to our answering service and the on call provider will answer. Please try to keep non emergent phone calls/ questions to office hours 9am-5pm Monday through Friday.      [employer]  As an alternative, you can sign up and use the Epic MyChart system for more direct and quicker access for non emergent questions/ problems.  [facility] S[employer]Quest allows you to send messages to your doctor, view your test results, renew your prescriptions, schedule appointments, and more. To sign up, go to [employer] and click on the Sign Up Now link in the New User? box. Enter your SageQuest Activation Code exactly as it appears below along with the last four digits of your Social Security Number and your Date of Birth () to complete the sign-up process. If you do not sign up before the expiration date, you must request a new code.     SageQuest Activation Code: Activation code not generated  Current SageQuest Status: Active     If you have questions, you can email [email or web address] or call [phone] to talk to our SageQuest staff. Remember, SageQuest is NOT to be used for urgent needs. For medical emergencies, dial 911.     IF YOU SMOKE OR USE TOBACCO PLEASE READ THE FOLLOWING:  Why is smoking bad for me?  Smoking increases the risk of heart disease, lung disease, vascular disease, stroke, and cancer. If you smoke, STOP!        IF YOU SMOKE OR USE TOBACCO PLEASE READ THE FOLLOWING:  Why is smoking bad for me?  Smoking increases the risk of heart disease, lung disease, vascular disease, stroke, and cancer. If you smoke, STOP!     For more information:  Quit Now  [place]  1-800-QUIT-NOW  https://[email or web address].[email or web address]/en-US/

## 2024-08-27 ENCOUNTER — TELEPHONE (OUTPATIENT)
Dept: PULMONOLOGY | Facility: CLINIC | Age: 44
End: 2024-08-27
Payer: COMMERCIAL

## (undated) DEVICE — POSITIONER,HEAD,MULTIRING,36CS: Brand: MEDLINE

## (undated) DEVICE — KIT,ANTI FOG,W/SPONGE & FLUID,SOFT PACK: Brand: MEDLINE

## (undated) DEVICE — TUBING, SUCTION, 1/4" X 12', STRAIGHT: Brand: MEDLINE

## (undated) DEVICE — TOWEL,OR,DSP,ST,BLUE,STD,4/PK,20PK/CS: Brand: MEDLINE

## (undated) DEVICE — SURGICAL SUCTION CONNECTING TUBE WITH MALE CONNECTOR AND SUCTION CLAMP, 2 FT. LONG (.6 M), 5 MM I.D.: Brand: CONMED

## (undated) DEVICE — 4-PORT MANIFOLD: Brand: NEPTUNE 2

## (undated) DEVICE — BRONCH VIDEO GLIDESCOPE BFLEX/2 2.8MM ULTR/SLIM 1P/U